# Patient Record
Sex: MALE | Race: WHITE
[De-identification: names, ages, dates, MRNs, and addresses within clinical notes are randomized per-mention and may not be internally consistent; named-entity substitution may affect disease eponyms.]

---

## 2018-07-03 ENCOUNTER — HOSPITAL ENCOUNTER (EMERGENCY)
Dept: HOSPITAL 25 - ED | Age: 68
Discharge: HOME | End: 2018-07-03
Payer: COMMERCIAL

## 2018-07-03 VITALS — SYSTOLIC BLOOD PRESSURE: 133 MMHG | DIASTOLIC BLOOD PRESSURE: 74 MMHG

## 2018-07-03 DIAGNOSIS — Z87.891: ICD-10-CM

## 2018-07-03 DIAGNOSIS — Z79.84: ICD-10-CM

## 2018-07-03 DIAGNOSIS — E11.9: ICD-10-CM

## 2018-07-03 DIAGNOSIS — I10: ICD-10-CM

## 2018-07-03 DIAGNOSIS — E07.9: ICD-10-CM

## 2018-07-03 DIAGNOSIS — K61.0: Primary | ICD-10-CM

## 2018-07-03 DIAGNOSIS — E78.00: ICD-10-CM

## 2018-07-03 PROCEDURE — 87640 STAPH A DNA AMP PROBE: CPT

## 2018-07-03 PROCEDURE — 87205 SMEAR GRAM STAIN: CPT

## 2018-07-03 PROCEDURE — 99282 EMERGENCY DEPT VISIT SF MDM: CPT

## 2018-07-03 PROCEDURE — 87641 MR-STAPH DNA AMP PROBE: CPT

## 2018-07-03 PROCEDURE — 10060 I&D ABSCESS SIMPLE/SINGLE: CPT

## 2018-07-03 PROCEDURE — 87070 CULTURE OTHR SPECIMN AEROBIC: CPT

## 2018-07-03 PROCEDURE — 87077 CULTURE AEROBIC IDENTIFY: CPT

## 2018-07-03 NOTE — ED
Harpal KAUR Angela, scribed for Jerald Maki MD on 07/03/18 at 2036 .





Skin Complaint





- HPI Summary


HPI Summary: 





This pt is a 66 y/o [place] [facility] [place] on buttocks for the 

past 4 days. Pt reports he has had this same abscess 9 years ago and again 2 

years ago. He notes this new abscess began 4 days ago and is described as red, 

raised, and very painful. He notes he is unable to sit and hardly walk 

secondary to pain. Denies drainage from abscess. Denies fever, chills. 


PMHx includes thyroid, diabetes, high cholesterol, HTN. Pt is not on Insulin. 

Denies bowel disease or Crohn's disease. 


He is a former smoker. 





- History of Current Complaint


Chief Complaint: EDRashSkinAbscess


Stated Complaint: ABSCESS


Hx Obtained From: Patient


Onset/Duration: Started Days Ago, Still Present


Skin Exposure Onset/Duration: Days Ago


Timing: Lasting Days


Current Severity: Moderate


Pain Intensity: 6


Pain Scale Used: 0-10 Numeric


Skin Location: Other: - buttocks


Character: Pain, Redness, Raised


Aggravating Symptom(s): Nothing


Alleviating Symptom(s): Nothing


Associated Signs & Symptoms: Negative





- Allergy/Home Medications


Allergies/Adverse Reactions: 


 Allergies











Allergy/AdvReac Type Severity Reaction Status Date / Time


 


No Known Allergies Allergy   Verified 07/03/18 20:08











Home Medications: 


 Home Medications





Levothyroxine TAB* 150 mg PO DAILY 07/03/18 [History Confirmed 07/03/18]


Lisinopril/HCTZ 20/12.5(NF) 1 tab PO DAILY 07/03/18 [History Confirmed 07/03/18]


Pravastatin Sodium 40 mg PO DAILY 07/03/18 [History Confirmed 07/03/18]


metFORMIN* 850 mg PO TID 07/03/18 [History Confirmed 07/03/18]











PMH/Surg Hx/FS Hx/Imm Hx


Endocrine/Hematology History: Reports: Hx Diabetes


Cardiovascular History: Reports: Hx Hypertension, Other Cardiovascular Problems/

Disorders - high cholesterol


GI History: 


   Denies: Hx Crohn's Disease, Hx Irritable Bowel


Infectious Disease History: No


Infectious Disease History: 


   Denies: Traveled Outside the [place] in Last 30 Days





- Family History


Known Family History: 


   Negative: Renal Disease





- Social History


Alcohol Use: None


Substance Use Type: Reports: None


Smoking Status (MU): Former Smoker





Review of Systems


Negative: Fever, Chills


ENT: Negative


Cardiovascular: Negative


Respiratory: Negative


Gastrointestinal: Negative


Skin: Other - abscess on buttocks


All Other Systems Reviewed And Are Negative: Yes





Physical Exam





- Summary


Physical Exam Summary: 





Appearance: Well appearing, no pain distress


Skin: warm, dry. Pt has a perianal abscess. Area is about 2.5 x 2 cm of 

fluctuance. I ruptured a little pustule. 


Head/face: normal


Eyes: EOMI, NAY


ENT: normal


Neck: supple, non-tender


Respiratory: CTA, breath sounds present


Cardiovascular: RRR, pulses symmetrical 


Abdomen: non-tender, soft


Bowel: present


Musculoskeletal: normal, strength/ROM intact


Neuro: normal, sensory motor intact, A&Ox3


Triage Information Reviewed: Yes


Vital Signs On Initial Exam: 


 Initial Vitals











Temp Pulse Resp BP Pulse Ox


 


 98.6 F   92   16   140/83   96 


 


 07/03/18 20:04  07/03/18 20:04  07/03/18 20:04  07/03/18 20:04  07/03/18 20:04











Vital Signs Reviewed: Yes





Procedures





- Incision and Drainage


  ** Buttocks


Site: perianal abscess.


Anesthesia: Lidocaine - 1% - used 5 CC


Instrument(s): Scalpel - 11 blade scalpel: drained a lot of pus and sent 

culture to the lab. Pt had significant relief afterwards.


Packing: Gauze - iodoform





Diagnostics





- Vital Signs


 Vital Signs











  Temp Pulse Resp BP Pulse Ox


 


 07/03/18 20:04  98.6 F  92  16  140/83  96














- Laboratory


Lab Statement: Any lab studies that have been ordered have been reviewed, and 

results considered in the medical decision making process.





Re-Evaluation





- Re-Evaluation


  ** First Eval


Re-Evaluation Time: 20:51


Change: Improved


Comment: Performed I&D of perianal abscess. Abscess drained a lot of pus and 

sent culture to the lab. He had significantly relief afterwards.





Course/Dx





- Course


Course Of Treatment: Patient expressed great relief after incision and drainage 

of perianal abscess.  He was given oral Levaquin here.  This will not be 

continued given his metformin.  Instead he'll be changed to oral Bactrim.  A 

culture was obtained but this is likely polymicrobial.  He will follow up with 

the surgeons in the office for wound recheck and further plan.





- Diagnoses


Provider Diagnoses: 


 Perianal abscess








Discharge





- Sign-Out/Discharge


Documenting (check all that apply): Discharge/Admit/Transfer





- Discharge Plan


Condition: Improved


Disposition: HOME


Prescriptions: 


Lidocaine 2% JELLY* 1 applic TOPICAL QID PRN #1 tube


 PRN Reason: rectal pain


Sulfamethox/Trimethoprim DS* [Bactrim /160 TAB*] 2 tab PO BID #28 tab


traMADol TAB* [Ultram*] 50 mg PO Q6HR PRN #6 tab MDD 4


 PRN Reason: more severe pain


Patient Education Materials:  Anorectal Abscess and Anal Fistula (ED), Abscess 

Incision and Drainage (DC)


Referrals: 


Alan Wong MD [Medical Doctor] - 


Additional Instructions: 


Keep area clean and dry.  Sitz baths as needed.  Call to follow-up on Thursday 

with the surgeon.  Return if worse, new symptoms or other concerns.





- Billing Disposition and Condition


Condition: IMPROVED


Disposition: Home





The documentation as recorded by the Harpal zavala Angela accurately reflects 

the service I personally performed and the decisions made by me, Jerald Maki MD.

## 2019-12-01 ENCOUNTER — HOSPITAL ENCOUNTER (EMERGENCY)
Dept: HOSPITAL 25 - ED | Age: 69
Discharge: HOME | End: 2019-12-01
Payer: COMMERCIAL

## 2019-12-01 VITALS — DIASTOLIC BLOOD PRESSURE: 87 MMHG | SYSTOLIC BLOOD PRESSURE: 131 MMHG

## 2019-12-01 DIAGNOSIS — E78.00: ICD-10-CM
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
## 2019-12-01 DIAGNOSIS — Z87.891: ICD-10-CM

## 2019-12-01 DIAGNOSIS — S61.411A: Primary | ICD-10-CM

## 2019-12-01 DIAGNOSIS — E11.9: ICD-10-CM

## 2019-12-01 DIAGNOSIS — I10: ICD-10-CM
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
## 2019-12-01 PROCEDURE — 99282 EMERGENCY DEPT VISIT SF MDM: CPT

## 2019-12-01 PROCEDURE — 12002 RPR S/N/AX/GEN/TRNK2.6-7.5CM: CPT
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
